# Patient Record
Sex: MALE | Race: WHITE | NOT HISPANIC OR LATINO | URBAN - NONMETROPOLITAN AREA
[De-identification: names, ages, dates, MRNs, and addresses within clinical notes are randomized per-mention and may not be internally consistent; named-entity substitution may affect disease eponyms.]

---

## 2020-02-25 ENCOUNTER — OFFICE (OUTPATIENT)
Dept: URBAN - NONMETROPOLITAN AREA CLINIC 15 | Facility: CLINIC | Age: 80
End: 2020-02-25
Payer: COMMERCIAL

## 2020-02-25 VITALS
WEIGHT: 213 LBS | DIASTOLIC BLOOD PRESSURE: 75 MMHG | SYSTOLIC BLOOD PRESSURE: 139 MMHG | HEART RATE: 83 BPM | RESPIRATION RATE: 20 BRPM

## 2020-02-25 DIAGNOSIS — R05 COUGH: ICD-10-CM

## 2020-02-25 DIAGNOSIS — K21.9 GASTRO-ESOPHAGEAL REFLUX DISEASE WITHOUT ESOPHAGITIS: ICD-10-CM

## 2020-02-25 PROCEDURE — 99213 OFFICE O/P EST LOW 20 MIN: CPT | Performed by: INTERNAL MEDICINE

## 2020-02-25 NOTE — SERVICEHPINOTES
Roni Farrell   is a   80   year old  male   who is here today for routine follow up.I last saw the patient on 02/25/2020 for symptoms of chronic cough and concern about possible LPR. Patient was placed on omeprazole 40 mg p.o. q.a.m. along with Pepcid 40 mg in the evening after a short course of Dexilant. He states he is having no problems with reflux or dysphagia. He still notices a bothersome what he describes is hacking cough that is nonproductive. He has not been back to see Dr. Patricia. No shortness of breath.

## 2020-02-25 NOTE — SERVICENOTES
I did receive a copy of the patient's colonoscopy report from 03/23/2010 by Dr. Dougherty which revealed diverticulosis only.  We did not receive a copy of a EGD report.

## 2022-09-19 ENCOUNTER — OFFICE (OUTPATIENT)
Dept: URBAN - NONMETROPOLITAN AREA CLINIC 5 | Facility: CLINIC | Age: 82
End: 2022-09-19

## 2022-09-19 VITALS
HEART RATE: 60 BPM | SYSTOLIC BLOOD PRESSURE: 127 MMHG | DIASTOLIC BLOOD PRESSURE: 75 MMHG | WEIGHT: 190 LBS | HEIGHT: 71 IN

## 2022-09-19 DIAGNOSIS — I48.91 UNSPECIFIED ATRIAL FIBRILLATION: ICD-10-CM

## 2022-09-19 DIAGNOSIS — K21.9 GASTRO-ESOPHAGEAL REFLUX DISEASE WITHOUT ESOPHAGITIS: ICD-10-CM

## 2022-09-19 DIAGNOSIS — T18.2XXA FOREIGN BODY IN STOMACH, INITIAL ENCOUNTER: ICD-10-CM

## 2022-09-19 DIAGNOSIS — K92.0 HEMATEMESIS: ICD-10-CM

## 2022-09-19 PROCEDURE — 99214 OFFICE O/P EST MOD 30 MIN: CPT | Performed by: NURSE PRACTITIONER

## 2024-07-18 ENCOUNTER — OFFICE (OUTPATIENT)
Dept: URBAN - NONMETROPOLITAN AREA CLINIC 5 | Facility: CLINIC | Age: 84
End: 2024-07-18
Payer: COMMERCIAL

## 2024-07-18 VITALS
HEART RATE: 64 BPM | HEIGHT: 71 IN | WEIGHT: 174 LBS | RESPIRATION RATE: 16 BRPM | SYSTOLIC BLOOD PRESSURE: 106 MMHG | DIASTOLIC BLOOD PRESSURE: 56 MMHG

## 2024-07-18 DIAGNOSIS — K74.60 UNSPECIFIED CIRRHOSIS OF LIVER: ICD-10-CM

## 2024-07-18 PROCEDURE — 99214 OFFICE O/P EST MOD 30 MIN: CPT | Performed by: NURSE PRACTITIONER

## 2024-07-18 NOTE — SERVICENOTES
Patient and daughter given education of need to rule out potential causes of cirrhosis.  They verbalize understanding and consent to the plan.

## 2024-07-18 NOTE — SERVICEHPINOTES
Roni Farrell   is a   84   year old  male   who is here today for incidental findings of cirrhosis , referral by Dr. Phillip Gordillo. Patient was found to have cirrhosis on imaging upon evaluation of  a fall and femur fracture.  Patient denies a history of alcohol use.  He denies a history of iv drug use, exposures to hepatitis, or tattoos. He has a PMH of diabetes and hyperlipidemia among many others listed below.    His daughter is a Np who reports he did have an EGD approximately two years ago and he is without a known history of esophageal varices.